# Patient Record
Sex: MALE | Race: WHITE | Employment: FULL TIME | URBAN - METROPOLITAN AREA
[De-identification: names, ages, dates, MRNs, and addresses within clinical notes are randomized per-mention and may not be internally consistent; named-entity substitution may affect disease eponyms.]

---

## 2017-10-17 ENCOUNTER — OFFICE VISIT (OUTPATIENT)
Dept: ENT CLINIC | Age: 62
End: 2017-10-17

## 2017-10-17 VITALS — HEIGHT: 70 IN | WEIGHT: 158 LBS | BODY MASS INDEX: 22.62 KG/M2

## 2017-10-17 DIAGNOSIS — H61.21 IMPACTED CERUMEN OF RIGHT EAR: Primary | ICD-10-CM

## 2017-10-17 DIAGNOSIS — J30.9 ALLERGIC SINUSITIS: ICD-10-CM

## 2017-10-17 PROCEDURE — 99213 OFFICE O/P EST LOW 20 MIN: CPT | Performed by: OTOLARYNGOLOGY

## 2017-10-17 PROCEDURE — 96372 THER/PROPH/DIAG INJ SC/IM: CPT | Performed by: OTOLARYNGOLOGY

## 2017-10-17 RX ORDER — FLUTICASONE PROPIONATE 50 MCG
2 SPRAY, SUSPENSION (ML) NASAL DAILY
Qty: 1 BOTTLE | Refills: 1 | Status: SHIPPED | OUTPATIENT
Start: 2017-10-17 | End: 2017-10-17

## 2017-10-17 RX ORDER — METHYLPREDNISOLONE ACETATE 40 MG/ML
40 INJECTION, SUSPENSION INTRA-ARTICULAR; INTRALESIONAL; INTRAMUSCULAR; SOFT TISSUE ONCE
Status: COMPLETED | OUTPATIENT
Start: 2017-10-17 | End: 2017-10-17

## 2017-10-17 RX ORDER — FLUTICASONE PROPIONATE 50 MCG
2 SPRAY, SUSPENSION (ML) NASAL DAILY
Qty: 1 BOTTLE | Refills: 3 | Status: SHIPPED | OUTPATIENT
Start: 2017-10-17

## 2017-10-17 RX ADMIN — METHYLPREDNISOLONE ACETATE 40 MG: 40 INJECTION, SUSPENSION INTRA-ARTICULAR; INTRALESIONAL; INTRAMUSCULAR; SOFT TISSUE at 13:31

## 2025-07-15 NOTE — PROGRESS NOTES
Patient understands condition, prognosis and need for follow up care. Recently, the patient has noticed a headache in the supraorbital area which responded to improving after he had some bloody drainage from his nose. He now has no particular drainage from his nose and no evidence of any fever or headache. He does notice some congestion. There has been no color to the drainage. He has had no fever and does not smoke. Currently he is in no acute distress. Ear examination reveals normal findings on the left. On the right, however, there is a significant cerumen impaction which is occluding the canal.  This is removed with a variety of instrumentation including suctioning, irrigation, curettage, and cleaning with the peroxide. The tympanic membrane that appears to be normal.  The oral examination is unremarkable. The neck is free of adenopathy, mass, thyroid enlargement. Nasal mucosa treated with cotton pledgets  impregnated with Afrin and lidocaine placed in middle meatuses against turbinates. Pledgets left in place for five minutes and removed enabling enhanced visualization. The exam revealed positive edema of mucosa. it was negative for erythema indicative of infection. There was not evidence of deviation of the septum which was not significant. There were not polyps present. .There were not other masses present. The turbinates were not enlarged beyond normal.  At this point, findings appear to be that of allergic sinusitis. He will continue use of Flonase and we will give him a Depo-Medrol injection. today